# Patient Record
Sex: MALE | Race: WHITE | NOT HISPANIC OR LATINO | Employment: FULL TIME | URBAN - METROPOLITAN AREA
[De-identification: names, ages, dates, MRNs, and addresses within clinical notes are randomized per-mention and may not be internally consistent; named-entity substitution may affect disease eponyms.]

---

## 2020-05-12 ENCOUNTER — OFFICE VISIT (OUTPATIENT)
Dept: URGENT CARE | Facility: MEDICAL CENTER | Age: 40
End: 2020-05-12
Payer: COMMERCIAL

## 2020-05-12 ENCOUNTER — APPOINTMENT (OUTPATIENT)
Dept: RADIOLOGY | Facility: MEDICAL CENTER | Age: 40
End: 2020-05-12
Payer: COMMERCIAL

## 2020-05-12 VITALS
HEIGHT: 77 IN | DIASTOLIC BLOOD PRESSURE: 71 MMHG | BODY MASS INDEX: 27.75 KG/M2 | HEART RATE: 94 BPM | RESPIRATION RATE: 16 BRPM | WEIGHT: 235 LBS | SYSTOLIC BLOOD PRESSURE: 136 MMHG | OXYGEN SATURATION: 97 %

## 2020-05-12 DIAGNOSIS — M25.572 ACUTE LEFT ANKLE PAIN: ICD-10-CM

## 2020-05-12 DIAGNOSIS — M79.662 PAIN OF LEFT CALF: ICD-10-CM

## 2020-05-12 DIAGNOSIS — M25.572 ACUTE LEFT ANKLE PAIN: Primary | ICD-10-CM

## 2020-05-12 PROCEDURE — G0382 LEV 3 HOSP TYPE B ED VISIT: HCPCS | Performed by: FAMILY MEDICINE

## 2020-05-12 PROCEDURE — 73610 X-RAY EXAM OF ANKLE: CPT

## 2020-05-12 RX ORDER — ACETAMINOPHEN 325 MG/1
975 TABLET ORAL ONCE
Status: COMPLETED | OUTPATIENT
Start: 2020-05-12 | End: 2020-05-12

## 2020-05-12 RX ADMIN — ACETAMINOPHEN 975 MG: 325 TABLET ORAL at 17:30

## 2020-05-14 ENCOUNTER — HOSPITAL ENCOUNTER (OUTPATIENT)
Dept: MRI IMAGING | Facility: HOSPITAL | Age: 40
Discharge: HOME | End: 2020-05-14
Attending: ORTHOPAEDIC SURGERY
Payer: COMMERCIAL

## 2020-05-14 ENCOUNTER — TELEPHONE (OUTPATIENT)
Dept: OBGYN CLINIC | Facility: HOSPITAL | Age: 40
End: 2020-05-14

## 2020-05-14 VITALS
BODY MASS INDEX: 27.75 KG/M2 | SYSTOLIC BLOOD PRESSURE: 123 MMHG | HEIGHT: 77 IN | DIASTOLIC BLOOD PRESSURE: 74 MMHG | HEART RATE: 77 BPM | TEMPERATURE: 98.1 F | WEIGHT: 235 LBS

## 2020-05-14 DIAGNOSIS — S86.012A ACHILLES TENDON RUPTURE, LEFT, INITIAL ENCOUNTER: ICD-10-CM

## 2020-05-14 DIAGNOSIS — S86.012A ACHILLES TENDON RUPTURE, LEFT, INITIAL ENCOUNTER: Primary | ICD-10-CM

## 2020-05-14 DIAGNOSIS — M79.662 PAIN OF LEFT CALF: ICD-10-CM

## 2020-05-14 PROCEDURE — 99203 OFFICE O/P NEW LOW 30 MIN: CPT | Performed by: ORTHOPAEDIC SURGERY

## 2020-05-14 PROCEDURE — 73721 MRI JNT OF LWR EXTRE W/O DYE: CPT

## 2020-05-14 RX ORDER — IBUPROFEN 200 MG
TABLET ORAL EVERY 6 HOURS PRN
COMMUNITY

## 2020-05-14 RX ORDER — ACETAMINOPHEN 500 MG
500 TABLET ORAL EVERY 6 HOURS PRN
COMMUNITY
End: 2020-09-04

## 2020-05-15 VITALS
BODY MASS INDEX: 27.87 KG/M2 | HEIGHT: 77 IN | HEART RATE: 79 BPM | SYSTOLIC BLOOD PRESSURE: 123 MMHG | DIASTOLIC BLOOD PRESSURE: 76 MMHG

## 2020-05-15 DIAGNOSIS — S86.012A ACHILLES TENDON RUPTURE, LEFT, INITIAL ENCOUNTER: Primary | ICD-10-CM

## 2020-05-15 PROCEDURE — 99213 OFFICE O/P EST LOW 20 MIN: CPT | Performed by: ORTHOPAEDIC SURGERY

## 2020-05-29 VITALS
SYSTOLIC BLOOD PRESSURE: 129 MMHG | BODY MASS INDEX: 27.87 KG/M2 | HEART RATE: 78 BPM | DIASTOLIC BLOOD PRESSURE: 77 MMHG | HEIGHT: 77 IN

## 2020-05-29 DIAGNOSIS — S86.012D RUPTURE OF LEFT ACHILLES TENDON, SUBSEQUENT ENCOUNTER: Primary | ICD-10-CM

## 2020-05-29 PROCEDURE — 99213 OFFICE O/P EST LOW 20 MIN: CPT | Performed by: ORTHOPAEDIC SURGERY

## 2020-07-10 VITALS — HEIGHT: 77 IN | BODY MASS INDEX: 27.87 KG/M2

## 2020-07-10 DIAGNOSIS — S86.012D RUPTURE OF LEFT ACHILLES TENDON, SUBSEQUENT ENCOUNTER: Primary | ICD-10-CM

## 2020-07-10 PROCEDURE — 99213 OFFICE O/P EST LOW 20 MIN: CPT | Performed by: ORTHOPAEDIC SURGERY

## 2020-07-10 NOTE — PATIENT INSTRUCTIONS
ALEXANDER Malagon    Attending, Orthopaedic Surgery  [facility]    Achilles Rupture - Nonoperative Treatment Protocol    OVERVIEW:   Week 0: cast, touchdown weightbearing with crutches / walker  o Avoid prolonged dependent position (keep foot elevated)   Week 2: CAM walker boot with 3 heel wedges, weightbearing as tolerated  o Wear boot AT ALL TIMES   Week 6: CAM walker boot, remove one wedge each week (so down to 2 wedges at week 6, 1 wedge at week 7, 0 wedges at week 8)  o Start gentle active ankle range of motion (trace alphabet with toes, circles in both directions)   Week 8: start PT, transition to shoe with heel lift, wean off crutches  o PT 2-3 times/week and home exercises daily   Progress with PT over ~4 months  o Week 12: wean off of shoe lift  o Week 20 / 5 months: gentle jog   Week 24 / 6 months:  o Gradual return to sports as tolerated    DETAILED PHYSICAL THERAPY PROTOCOL:  Phase I: weeks 8 - 12   Goals:  o Normalize gait  o Progress range of motion  o Normalize dorsiflexion, inversion, and eversion ankle strength 5/5   Treatment Recommendations:  o Gait training, wean off crutches/cane when gait non-antalgic  o Heel lift in shoe to assist non-apprehensive and normalized gait  o AROM dorsiflexion/plantarflexion/inversion/eversion  o Proprioception training  o Isometrics/isotonics: inversion/eversion  o Sitting heel rise  o PREs plantarflexion/dorsiflexion with knee flexed to 90; after 2 weeks, progress to PREs with knee extended to 0  o Leg press  o Bike  o Alphabet drawing  o Retro treadmill  o Forward step up program  o Underwater treadmill system for gait training   Precautions:  o Avoid passive heel cord stretching   Minimum Criteria for Advancement:  o Normal gait pattern  o Manual muscle grade test of 5/5 for dorsiflexion, inversion, and eversion    Phase II: weeks 12 - 20   Goals:  o Restore full functional range of motion  o Normalize plantarflexion strength 5/5  o Normalize balance  o Return to functional activities without pain  o Ability to descend stairs   Treatment Recommendations:  o Submaximal sport-specific skill development  o Proprioception training: BAPS, prop board, foam rollers, trampoline, [employer]  o Isometrics/isotonics: inversion/eversion  o Isokinetic plantarflexion/dorsiflexion  o Standing heel rise  o Aggressive PREs plantarflexion  o Progress proximal strengthening (PREs)  o Bike, Stairmaster, Versaclimber  o Forward step down program  o Running in underwater treadmill system   Precautions:  o Avoid pain with therapeutic exercise and functional activities  o Avoid high loading the Achilles tendon (i e  aggressive stretching in dorsiflexion with body weight or jumping)   Minimum Criteria for Advancement:  o No apprehension with activities of daily living  o Normal flexibility  o Adequate strength base shown by ability to perform ten unilateral heel raises  o Ability to descend stairs reciprocally  o Symmetrical lower extremity balance    Phase III: weeks 20 - 28   Goals:  o Demonstrate ability to run forward on a treadmill symptom-free  o Average peak torque of 75% with isokinetic testing  o Maximize strength and flexibility as to meet all demands of ADLs  o Return to functional activity without limitation  o Higher level of dynamic activity with lack of apprehension with sport-specific movements   Treatment Recommendations:  o Start forward treadmill running  o Isokinetic testing and training  o Continue lower extremity strengthening and flexibility program  o Advance proprioception training with perturbation  o Light plyometric training (bilateral jumping activities)  o Continue aggressive plantarflexion PREs (emphasize eccentric activity)  o Submaximal sport specific skill development drills  o Progress bike, Stairmaster, Versaclimber  o Continue to progress proximal strengthening of lower extremities (PREs)   Precautions:  o No apprehension or pain with dynamic activity  o Avoid running or sport activity until adequate strength and flexibility is achieved   Minimum Criteria for Advancement:  o Pain free running  o Average peak torque of isokinetic test = 75% of non-involved  o Normal strength (5/5 throughout ankle)  o Sports-specific drills with zero apprehension    Phase IV: Return to Sport (weeks 28 - one year)   Goals:  o Lack of apprehension with sports activity  o Maximize strength and flexibility as to meet demands of individual's sport activity   Treatment Recommendations:  o Advanced functional exercises and agility exercises  o Plyometrics  o Sport-specific exercises  o Isokinetic testing  o Functional test assessment (such as vertical jump test)   Precautions:  o Avoid pain with therapeutic, functional, and sport activity  o Avoid full sport activity until adequate strength and flexibility   Criteria for Discharge:  o Flexibility and strength to accepted levels for sports performance  o Lack of apprehension with sport-specific movements  o 85% limb symmetry with vertical jump test  o 85% limb symmetry for average peak isokinetic torque (PF/DF/inv/ev)  o Independent performance of gym/home exercise program

## 2020-07-10 NOTE — PROGRESS NOTES
ALEXANDER Loco  Attending, Orthopaedic Surgery  [facility] [address] [facility] [address] [facility]      ORTHOPAEDIC FOOT AND ANKLE CLINIC VISIT     Assessment:     Encounter Diagnosis   Name Primary?  Rupture of left Achilles tendon, subsequent encounter Yes            Plan:   · The patient verbalized understanding of exam findings and treatment plan  We engaged in the shared decision-making process and treatment options were discussed at length with the patient  Surgical and conservative management discussed today along with risks and benefits  · He is now 8 weeks from the injury  His resting equinus tone has returned to baseline which is great  · He will start PT  Protocol provided  · Lovenox completed  · Wean boot into sneaker with soft heel lift for next 4 weeks  · See back in 6 weeks      History of Present Illness:   Chief Complaint:   Chief Complaint   Patient presents with    Left Ankle - Follow-up     Bonilla Sanchez is a 37 y o  male who is being seen in follow-up for left achilles rupture  When we last saw he we recommended heel lift CAM boot  Pain has improved  Residual pain is localized at ankle with minimal radiating and described as sharp and severe  Pain/symptom timing:  Worse during the day when active  Pain/symptom context:  Worse with activites and work  Pain/symptom modifying factors:  Rest makes better, activities make worse  Pain/symptom associated signs/symptoms: none    Prior treatment   · NSAIDsYes   · Injections No   · Bracing/Orthotics Yes    · Physical Therapy No     Orthopedic Surgical History:   See below    Past Medical, Surgical and Social History:  Past Medical History:  has no past medical history on file  Problem List: does not have a problem list on file  Past Surgical History:  has no past surgical history on file  Family History: family history is not on file  Social History:  reports that he has never smoked   He has never used smokeless tobacco  His alcohol and drug histories are not on file  Current Medications: has a current medication list which includes the following prescription(s): acetaminophen, enoxaparin, and ibuprofen  Allergies: is allergic to naproxen  Review of Systems:  General- denies fever/chills  HEENT- denies hearing loss or sore throat  Eyes- denies eye pain or visual disturbances, denies red eyes  Respiratory- denies cough or SOB  Cardio- denies chest pain or palpitations  GI- denies abdominal pain  Endocrine- denies urinary frequency  Urinary- denies pain with urination  Musculoskeletal- Negative except noted above  Skin- denies rashes or wounds  Neurological- denies dizziness or headache  Psychiatric- denies anxiety or difficulty concentrating    Physical Exam:   Ht 6' 5" (1 956 m)   BMI 27 87 kg/m²   General/Constitutional: No apparent distress: well-nourished and well developed  Eyes: normal ocular motion  Lymphatic: No appreciable lymphadenopathy  Respiratory: Non-labored breathing  Vascular: No edema, swelling or tenderness, except as noted in detailed exam   Integumentary: No impressive skin lesions present, except as noted in detailed exam   Neuro: No ataxia or tremors noted  Psych: Normal mood and affect, oriented to person, place and time  Appropriate affect  Musculoskeletal: Normal, except as noted in detailed exam and in HPI  Examination    Left    Gait Antalgic   Musculoskeletal No TTP    Skin Normal       Nails Normal    Range of Motion  20 degrees dorsiflexion, 30 degrees plantarflexion  Subtalar motion: normal    Stability Stable    Muscle Strength 5/5 tibialis anterior  5/5 gastrocnemius-soleus  5/5 posterior tibialis  5/5 peroneal/eversion strength  5/5 EHL  5/5 FHL    Neurologic Normal    Sensation  Intact to light touch throughout sural, saphenous, superficial peroneal, deep peroneal and medial/lateral plantar nerve distributions  [place] 5 07 filament (10g) testing deferred      Cardiovascular Brisk capillary refill < 2 seconds,intact DP and PT pulses    Special Tests None      Imaging Studies:   No new imaging      James R Lachman, MD  Foot & Ankle Surgery   Department of [address] [place] [address]      I personally performed the service  Marvina Ling Lachman, MD

## 2020-07-14 ENCOUNTER — EVALUATION (OUTPATIENT)
Dept: PHYSICAL THERAPY | Facility: CLINIC | Age: 40
End: 2020-07-14
Payer: COMMERCIAL

## 2020-07-14 DIAGNOSIS — S86.012D RUPTURE OF LEFT ACHILLES TENDON, SUBSEQUENT ENCOUNTER: ICD-10-CM

## 2020-07-14 PROCEDURE — 97161 PT EVAL LOW COMPLEX 20 MIN: CPT | Performed by: PHYSICAL THERAPIST

## 2020-07-14 PROCEDURE — 97112 NEUROMUSCULAR REEDUCATION: CPT | Performed by: PHYSICAL THERAPIST

## 2020-07-14 NOTE — PROGRESS NOTES
PT Evaluation     Today's date: 2020  Patient name: Kwame Laughlin  : 1980  MRN: [id number]  Referring provider: Alejandra Ro MD  Dx:   Encounter Diagnosis     ICD-10-CM    1  Rupture of left Achilles tendon, subsequent encounter S86 012D Ambulatory referral to Physical Therapy                  Assessment  Assessment details: 37year old male patient reports to PT with non-op L achilles tendon rupture which occurred about 8 5 weeks ago  Patient ambulating with CAM boot and is weaning from heel lifts in it and then transitioning to normal shoe over the next several days  No red flags noted and patient denies complications since initial injury  Patient presents as expected 8 5 weeks since injury  Progressing patient based on achilles tendon non-op protocol provided from patient's MD  Patient will benefit from skilled PT services to address current impairments and functional limitations to help patient return to his PLOF  Impairments: abnormal gait, abnormal or restricted ROM, abnormal movement, activity intolerance, impaired balance, impaired physical strength, lacks appropriate home exercise program and pain with function    Symptom irritability: lowUnderstanding of Dx/Px/POC: good   Prognosis: good    Goals  STG  1  Patient will be independent with completion of HEP throughout therapy  2  Patient will ambulate in a shoe on his L LE with no heel lifts without discomfort in 3 weeks  LTG  1  Patient will increase L ankle DF ROM to at least 8 deg so patient can begin to ambulate with more normal gait pattern in 8 weeks  2  Patient will navigate stairs without increase in symptoms in L ankle in 10 weeks  3  Patient will begin to run without discomfort in 12 weeks       Plan  Patient would benefit from: skilled physical therapy  Planned therapy interventions: joint mobilization, manual therapy, neuromuscular re-education, patient education, strengthening, stretching, therapeutic activities, therapeutic exercise, home exercise program, functional ROM exercises, flexibility, gait training, balance and balance/weight bearing training  Frequency: 2x week  Duration in weeks: 6  Treatment plan discussed with: patient        Subjective Evaluation    History of Present Illness  Mechanism of injury: Patient reports with achilles tendon rupture about 8 5 weeks while playing basketball  Patient is currently in CAM boot with one heel lift, which he has been weaning off of with lifts and starting to wear his shoes again  Patient uses scooter to get around when having to stand and walk long distances  Patient hasn't used crutches in a few weeks  Patient would like to get back to running, and being active  Pain  Current pain ratin  At best pain rating: 3  At worst pain ratin  Quality: dull ache and sharp  Relieving factors: change in position, ice and medications    Treatments  Current treatment: physical therapy  Patient Goals  Patient goals for therapy: decreased edema, decreased pain, increased motion, improved balance, increased strength and return to sport/leisure activities          Objective     Observations   Left Ankle/Foot   Positive for edema  Active Range of Motion     Additional Active Range of Motion Details  DF/PF completed with knee bent: significantly limited each direction AROM on L LE  Inversion:eversion moderately limited AROM on L LE    Strength/Myotome Testing     Additional Strength Details  Strength not tested    General Comments:       Ankle/Foot Comments   Objective testing limited due to being non-op achilles rupture 8 5 weeks out      Flowsheet Rows      Most Recent Value   PT/OT G-Codes   Current Score  55   Projected Score  76             Precautions: none      Manuals             Ankle PROM                                                    Neuro Re-Ed             SLS             Gait training over [facility] Ther Ex             Ankle inv/eversion isometrics to theraband reviewed            Ankle circles/alphabet with knee bent  reviewed            Seated heel raises with knee bent for 2 weeks reviewed            Seated ankle DF w/ knee bent for 2 weeks  reviewed                         Recumbent bike                                        Ther Activity                                       Gait Training                                       Modalities

## 2020-07-17 ENCOUNTER — OFFICE VISIT (OUTPATIENT)
Dept: PHYSICAL THERAPY | Facility: CLINIC | Age: 40
End: 2020-07-17
Payer: COMMERCIAL

## 2020-07-17 DIAGNOSIS — S86.012D RUPTURE OF LEFT ACHILLES TENDON, SUBSEQUENT ENCOUNTER: Primary | ICD-10-CM

## 2020-07-17 PROCEDURE — 97112 NEUROMUSCULAR REEDUCATION: CPT | Performed by: PHYSICAL THERAPIST

## 2020-07-17 PROCEDURE — 97110 THERAPEUTIC EXERCISES: CPT | Performed by: PHYSICAL THERAPIST

## 2020-07-17 NOTE — PROGRESS NOTES
Daily Note     Today's date: 2020  Patient name: Maxwell Denny  : 1980  MRN: [id number]  Referring provider: Florencia Messina MD  Dx:   Encounter Diagnosis     ICD-10-CM    1  Rupture of left Achilles tendon, subsequent encounter S86 012D                   Subjective: Patient notes getting more confident with his exercises  Objective: See treatment diary below      Assessment: Tolerated treatment well  Patient would benefit from continued PT  Patient down to 0 wedges in CAM boot, transitioning to shoe in a few days  Eversion/Inversion MRE completed and DF/PF completed with knee at 90 deg to limit stretch on achilles  No increase in pain post        Plan: Progress treatment as tolerated         Precautions: none      Manuals            Ankle PROM  20x CW/CCW                                                  Neuro Re-Ed             SLS             [facility]              Povole board  L1 10x CW/CCW                                                               Ther Ex             Ankle inv/eversion isometrics to theraband reviewed MRE 2x10 each           Ankle circles/alphabet with knee bent  reviewed            Seated heel raises with knee bent for 2 weeks reviewed 2x15            Seated ankle DF w/ knee bent for 2 weeks  reviewed 2x15                        Recumbent bike                                        Ther Activity                                       Gait Training                                       Modalities

## 2020-07-20 ENCOUNTER — OFFICE VISIT (OUTPATIENT)
Dept: PHYSICAL THERAPY | Facility: CLINIC | Age: 40
End: 2020-07-20
Payer: COMMERCIAL

## 2020-07-20 DIAGNOSIS — S86.012D RUPTURE OF LEFT ACHILLES TENDON, SUBSEQUENT ENCOUNTER: Primary | ICD-10-CM

## 2020-07-20 PROCEDURE — 97110 THERAPEUTIC EXERCISES: CPT | Performed by: PHYSICAL THERAPIST

## 2020-07-20 PROCEDURE — 97112 NEUROMUSCULAR REEDUCATION: CPT | Performed by: PHYSICAL THERAPIST

## 2020-07-20 NOTE — PROGRESS NOTES
Daily Note     Today's date: 2020  Patient name: Faiza June  : 1980  MRN: [id number]  Referring provider: Nay Dailey MD  Dx:   Encounter Diagnosis     ICD-10-CM    1  Rupture of left Achilles tendon, subsequent encounter S86 012D                   Subjective: Patient notes getting more confident with his exercises  Started to wear a shoe with 1 heel lift today, and has been feeling ok, just tired in his foot  Objective: See treatment diary below      Assessment: Tolerated treatment well  Patient would benefit from continued PT  Patient doing well with transition to shoe with heel lift  Resistance band initiated with inv/ev and DF/PF still ompleted with knee at 90 deg to limit stretch on achilles  Lower level balance exercises initiated this treatment  No increase in pain post        Plan: Progress treatment as tolerated         Precautions: none      Manuals           Ankle PROM  20x CW/CCW [facility]                                                 Neuro Re-Ed             SLS             Gait training over [facility]              MentiNovabble board  L1 10x CW/CCW L1 12x each way          Tandem stance   5x 10" holds                                                  Ther Ex             Ankle inv/eversion isometrics to theraband reviewed MRE 2x10 each 2x10 yellow band each          Ankle circles/alphabet with knee bent  reviewed            Seated heel raises with knee bent for 2 weeks reviewed 2x15  2x20          Seated ankle DF w/ knee bent for 2 weeks  reviewed 2x15 2x20                       Recumbent bike    stationary 6 min lvl 2                                     Ther Activity                                       Gait Training                                       Modalities

## 2020-07-24 ENCOUNTER — OFFICE VISIT (OUTPATIENT)
Dept: PHYSICAL THERAPY | Facility: CLINIC | Age: 40
End: 2020-07-24
Payer: COMMERCIAL

## 2020-07-24 DIAGNOSIS — S86.012D RUPTURE OF LEFT ACHILLES TENDON, SUBSEQUENT ENCOUNTER: Primary | ICD-10-CM

## 2020-07-24 PROCEDURE — 97110 THERAPEUTIC EXERCISES: CPT | Performed by: PHYSICAL THERAPIST

## 2020-07-24 PROCEDURE — 97112 NEUROMUSCULAR REEDUCATION: CPT | Performed by: PHYSICAL THERAPIST

## 2020-07-24 NOTE — PROGRESS NOTES
Daily Note     Today's date: 2020  Patient name: Gordo Barnhart  : 1980  MRN: [id number]  Referring provider: Emily Suresh MD  Dx:   Encounter Diagnosis     ICD-10-CM    1  Rupture of left Achilles tendon, subsequent encounter S86 012D                   Subjective: Patient notes transitioned to shoe without heel lift and even able to walk barefoot at home occasionally  Objective: See treatment diary below      Assessment: Tolerated treatment well  Patient would benefit from continued PT  Patient is 9 5 weeks out since initial injury and when patient was initially placed in boot  Patient doing well with transition to shoe without heel lift  Leg press and retro walking on treadmill completed to initiate more CKC exercises, and DF/PF still completed with knee at 90 deg to limit stretch on achilles  No increase in pain post        Plan: Progress treatment as tolerated         Precautions: none      Manuals          Ankle PROM  20x CW/CCW [address] [place]                      Posterior talocrural mobs L      nv                      Neuro Re-Ed             SLS             [facility]              Wobble board  L1 10x CW/CCW L1 12x each way L1 20x each way          Tandem stance   5x 10" holds  3x30"                                                Ther Ex             Ankle inv/eversion isometrics to theraband reviewed MRE 2x10 each 2x10 yellow band each 2x12 yellow each         Ankle circles/alphabet with knee bent  reviewed            Seated heel raises with knee bent for 2 weeks reviewed 2x15  2x20 2x20  8 lbs         Seated ankle DF w/ knee bent for 2 weeks  reviewed 2x15 2x20 2x20                       Recumbent bike    stationary 6 min lvl 2  6 min lvl3          Leg press    3x10 185 lbs          Retro walking on treadmill    2 min 0 3 mph         Ther Activity                                       Gait Training                                       Modalities

## 2020-07-27 ENCOUNTER — OFFICE VISIT (OUTPATIENT)
Dept: PHYSICAL THERAPY | Facility: CLINIC | Age: 40
End: 2020-07-27
Payer: COMMERCIAL

## 2020-07-27 DIAGNOSIS — S86.012D RUPTURE OF LEFT ACHILLES TENDON, SUBSEQUENT ENCOUNTER: Primary | ICD-10-CM

## 2020-07-27 PROCEDURE — 97110 THERAPEUTIC EXERCISES: CPT | Performed by: PHYSICAL THERAPIST

## 2020-07-27 PROCEDURE — 97112 NEUROMUSCULAR REEDUCATION: CPT | Performed by: PHYSICAL THERAPIST

## 2020-07-27 NOTE — PROGRESS NOTES
Daily Note     Today's date: 2020  Patient name: Radames Miranda  : 1980  MRN: [id number]  Referring provider: Whit Dash MD  Dx:   Encounter Diagnosis     ICD-10-CM    1  Rupture of left Achilles tendon, subsequent encounter S86 012D                   Subjective: Patient notes felt good after last treatment, worked 5 days in a row and didn't have much increase in swelling except at night, but when woke up the next day, had normal swelling  Patient notes a little sore in calf not achilles after last visit  Objective: See treatment diary below      Assessment: Tolerated treatment well  Patient would benefit from continued PT  Patient is 10 weeks out since initial injury and when patient was initially placed in boot  Patient progressing as expected  DF/PF still completed with knee at 90 deg to limit stretch on achilles  No increase in pain post        Plan: Progress treatment as tolerated         Precautions: none      Manuals         Ankle PROM  20x CW/CCW [address]                     Posterior talocrural mobs L      3x30"                      Neuro Re-Ed             SLS     Not ready yet        Gait training over [employer]              Wobble board  L1 10x CW/CCW L1 12x each way L1 20x each way  L2 20x each way        Tandem stance   5x 10" holds  3x30" 3x30"                                                Ther Ex             Ankle inv/eversion isometrics to theraband reviewed MRE 2x10 each 2x10 yellow band each 2x12 yellow each 2x12 red each         Ankle circles/alphabet with knee bent  reviewed            Seated heel raises with knee bent for 2 weeks reviewed 2x15  2x20 2x20  8 lbs 2x20 8 lbs         Seated ankle DF w/ knee bent for 2 weeks  reviewed 2x15 2x20 2x20  2x20         Step ups     4" step up L down L 10x        Recumbent bike    stationary 6 min lvl 2  6 min lvl3  6 min lvl 4         Leg press    3x10 185 lbs  3x15 205 lbs         Retro walking on treadmill 2 min 0 3 mph         Ther Activity                                       Gait Training                                       Modalities

## 2020-07-31 ENCOUNTER — OFFICE VISIT (OUTPATIENT)
Dept: PHYSICAL THERAPY | Facility: CLINIC | Age: 40
End: 2020-07-31
Payer: COMMERCIAL

## 2020-07-31 DIAGNOSIS — S86.012D RUPTURE OF LEFT ACHILLES TENDON, SUBSEQUENT ENCOUNTER: Primary | ICD-10-CM

## 2020-07-31 PROCEDURE — 97112 NEUROMUSCULAR REEDUCATION: CPT | Performed by: PHYSICAL THERAPIST

## 2020-07-31 PROCEDURE — 97110 THERAPEUTIC EXERCISES: CPT | Performed by: PHYSICAL THERAPIST

## 2020-07-31 NOTE — PROGRESS NOTES
Daily Note     Today's date: 2020  Patient name: Kwame Laughlin  : 1980  MRN: [id number]  Referring provider: Alejandra Ro MD  Dx:   Encounter Diagnosis     ICD-10-CM    1  Rupture of left Achilles tendon, subsequent encounter S86 012D        Start Time: 830          Subjective: Patient notes felt good after last treatment, worked 4 days in a row and had a long day yesterday at work so is a little sore today  Objective: See treatment diary below      Assessment: Tolerated treatment well  Patient would benefit from continued PT  Patient is 11 weeks out since initial injury and when patient was initially placed in boot  Patient progressing as expected  DF/PF still completed with knee extended this visit as per protocol allowed  No increase in pain post        Plan: Progress treatment as tolerated         Precautions: none      Manuals        Ankle PROM  20x CW/CCW [address]                    Posterior talocrural mobs L      3x30"  3x30"                     Neuro Re-Ed             SLS     Not ready yet        Gait training over bolster              Wobble board  L1 10x CW/CCW L1 12x each way L1 20x each way  L2 20x each way L2 20x each way        Tandem stance   5x 10" holds  3x30" 3x30"  3x30"                                               Ther Ex             Ankle inv/eversion isometrics to theraband reviewed MRE 2x10 each 2x10 yellow band each 2x12 yellow each 2x12 red each  2x12 red w/ DF/PF        Ankle circles/alphabet with knee bent  reviewed            Seated heel raises with knee bent for 2 weeks reviewed 2x15  2x20 2x20  8 lbs 2x20 8 lbs  2x10 8 lbs - 10 lbs        Seated ankle DF w/ knee bent for 2 weeks  reviewed 2x15 2x20 2x20  2x20  D/C       Step ups     4" step up L down L 10x 2x6 4"        Recumbent bike    stationary 6 min lvl 2  6 min lvl3  6 min lvl 4  6 min lvl 4        Leg press    3x10 185 lbs  3x15 205 lbs  3x15 205 lbs       Retro walking on treadmill    2 min 0 3 mph 2 min 0 3 mph 2 min 0 5 mph        Ther Activity                                       Gait Training                                       Modalities

## 2020-08-10 ENCOUNTER — APPOINTMENT (OUTPATIENT)
Dept: PHYSICAL THERAPY | Facility: CLINIC | Age: 40
End: 2020-08-10
Payer: COMMERCIAL

## 2020-08-12 ENCOUNTER — OFFICE VISIT (OUTPATIENT)
Dept: PHYSICAL THERAPY | Facility: CLINIC | Age: 40
End: 2020-08-12
Payer: COMMERCIAL

## 2020-08-12 DIAGNOSIS — S86.012D RUPTURE OF LEFT ACHILLES TENDON, SUBSEQUENT ENCOUNTER: Primary | ICD-10-CM

## 2020-08-12 PROCEDURE — 97112 NEUROMUSCULAR REEDUCATION: CPT | Performed by: PHYSICAL THERAPIST

## 2020-08-12 PROCEDURE — 97110 THERAPEUTIC EXERCISES: CPT | Performed by: PHYSICAL THERAPIST

## 2020-08-12 NOTE — PROGRESS NOTES
Daily Note     Today's date: 2020  Patient name: Gordo Barnhart  : 1980  MRN: [id number]  Referring provider: Emily Suresh MD  Dx:   Encounter Diagnosis     ICD-10-CM    1  Rupture of left Achilles tendon, subsequent encounter  S86 012D                   Subjective: Patient notes was on vacation last week, and has been progressing exercises and is walking better  Worked 13 hours yesterday, so a little tight today  Objective: See treatment diary below      Assessment: Tolerated treatment well  Patient would benefit from continued PT  Patient is 12 5 weeks out since initial injury and when patient was initially placed in boot  Patient progressing as expected  Protocol progressed  No increase in pain post        Plan: Progress treatment as tolerated         Precautions: none      Manuals       Ankle PROM  20x CW/CCW [address] [facility] [address]                   Posterior talocrural mobs L      3x30"  3x30"  3x30"                    Neuro Re-Ed             SLS     Not ready yet  3x20"       Gait training over bolster              Wobble board  L1 10x CW/CCW L1 12x each way L1 20x each way  L2 20x each way L2 20x each way  L3 20x each way       Tandem stance   5x 10" holds  3x30" 3x30"  3x30"  D/C                                             Ther Ex             Ankle inv/eversion isometrics to theraband reviewed MRE 2x10 each 2x10 yellow band each 2x12 yellow each 2x12 red each  2x12 red w/ DF/PF  2x12 grn w/ inv/EV/DF       Ankle circles/alphabet with knee bent  reviewed            Seated heel raises with knee bent for 2 weeks reviewed 2x15  2x20 2x20  8 lbs 2x20 8 lbs  2x10 8 lbs - 10 lbs  standing B heel raises 2x8       Seated ankle DF w/ knee bent for 2 weeks  reviewed 2x15 2x20 2x20  2x20  D/C       Step ups     4" step up L down L 10x 2x6 4"  2x8 8"       Recumbent bike    stationary 6 min lvl 2  6 min lvl3  6 min lvl 4  6 min lvl 4  6 min lvl 5       Leg press    3x10 185 lbs  3x15 205 lbs  3x15 205 lbs 3x10 225 lbs       Retro walking on treadmill    2 min 0 3 mph 2 min 0 3 mph 2 min 0 5 mph  2 min 0 7 mph      Step downs       8x 4" step                                 Ther Activity                                       Gait Training                                       Modalities

## 2020-08-13 ENCOUNTER — APPOINTMENT (OUTPATIENT)
Dept: PHYSICAL THERAPY | Facility: CLINIC | Age: 40
End: 2020-08-13
Payer: COMMERCIAL

## 2020-08-17 ENCOUNTER — OFFICE VISIT (OUTPATIENT)
Dept: PHYSICAL THERAPY | Facility: CLINIC | Age: 40
End: 2020-08-17
Payer: COMMERCIAL

## 2020-08-17 DIAGNOSIS — S86.012D RUPTURE OF LEFT ACHILLES TENDON, SUBSEQUENT ENCOUNTER: Primary | ICD-10-CM

## 2020-08-17 PROCEDURE — 97112 NEUROMUSCULAR REEDUCATION: CPT | Performed by: PHYSICAL THERAPIST

## 2020-08-17 PROCEDURE — 97110 THERAPEUTIC EXERCISES: CPT | Performed by: PHYSICAL THERAPIST

## 2020-08-17 NOTE — PROGRESS NOTES
Daily Note     Today's date: 2020  Patient name: Jennie Holden  : 1980  MRN: [id number]  Referring provider: Annelise Ignacio MD  Dx:   Encounter Diagnosis     ICD-10-CM    1  Rupture of left Achilles tendon, subsequent encounter  S86 012D                   Subjective: Patient notes felt little pull in his calf on Saturday walking, but not much pain after, and sore spot that was there last week is now gone  Objective: See treatment diary below      Assessment: Tolerated treatment well  Patient would benefit from continued PT  Patient is 13 weeks out since initial injury and when patient was initially placed in boot  Patient progressing as expected  Exercises progressed  No increase in pain post        Plan: Progress treatment as tolerated         Precautions: none      Manuals      Ankle PROM  20x CW/CCW [address] [facility] [address]     Calf stretch       3x30" 3x30"      Posterior talocrural mobs L      3x30"  3x30"  3x30"   [facility] [address]                  Neuro Re-Ed             SLS     Not ready yet  3x20"  5x 10" holds grn foam     Gait training over bolster              Wobble board  L1 10x CW/CCW L1 12x each way L1 20x each way  L2 20x each way L2 20x each way  L3 20x each way  L3 20x Cw/CCW/ PF,DF     Tandem stance   5x 10" holds  3x30" 3x30"  3x30"  D/C                                             Ther Ex             Ankle inv/eversion isometrics to theraband reviewed MRE 2x10 each 2x10 yellow band each 2x12 yellow each 2x12 red each  2x12 red w/ DF/PF  2x12 grn w/ inv/EV/DF  2x15 grn w/ inv/EV/DF     Ankle circles/alphabet with knee bent  reviewed            Seated heel raises with knee bent for 2 weeks reviewed 2x15  2x20 2x20  8 lbs 2x20 8 lbs  2x10 8 lbs - 10 lbs  standing B heel raises 2x8  2x10 10 lbs seated, standing B 2x10      Seated ankle DF w/ knee bent for 2 weeks  reviewed 2x15 2x20 2x20  2x20  D/C       Step ups     4" step up L down L 10x 2x6 4"  2x8 8"  2x10 8"      Recumbent bike    stationary 6 min lvl 2  6 min lvl3  6 min lvl 4  6 min lvl 4  6 min lvl 5  6 min lvl 6      Leg press    3x10 185 lbs  3x15 205 lbs  3x15 205 lbs 3x10 225 lbs  3x10 225 lbs     Retro walking on treadmill    2 min 0 3 mph 2 min 0 3 mph 2 min 0 5 mph  2 min 0 7 mph 3 min 0 7 mph     Step downs       8x 4" step  8x 6" step                               Ther Activity                                       Gait Training                                       Modalities

## 2020-08-20 ENCOUNTER — OFFICE VISIT (OUTPATIENT)
Dept: PHYSICAL THERAPY | Facility: CLINIC | Age: 40
End: 2020-08-20
Payer: COMMERCIAL

## 2020-08-20 DIAGNOSIS — S86.012D RUPTURE OF LEFT ACHILLES TENDON, SUBSEQUENT ENCOUNTER: Primary | ICD-10-CM

## 2020-08-20 PROCEDURE — 97140 MANUAL THERAPY 1/> REGIONS: CPT

## 2020-08-20 PROCEDURE — 97110 THERAPEUTIC EXERCISES: CPT

## 2020-08-20 PROCEDURE — 97112 NEUROMUSCULAR REEDUCATION: CPT

## 2020-08-20 NOTE — PROGRESS NOTES
Daily Note     Today's date: 2020  Patient name: Baltazar Greene  : 1980  MRN: [id number]  Referring provider: Mason Montano MD  Dx:   Encounter Diagnosis     ICD-10-CM    1  Rupture of left Achilles tendon, subsequent encounter  S86 012D                   Subjective: Pt reports continued decrease in pain and improved mobility the past few weeks, with most notable improvements on stairs, though he continues to note more limitations with descending versus ascending      Objective: See treatment diary below     Precautions: rupture 3/12/2020    Manuals     Ankle PROM  20x CW/CCW [address] [facility]  [address]    Calf stretch       3x30" 3x30"  SH    Posterior talocrural mobs L      3x30"  3x30"  3x30"  [address]                  Neuro Re-Ed             SLS     Not ready yet  3x20"  5x 10" holds grn foam Green foam 10"x5    Gait training over bolster              Wobble board  L1 10x CW/CCW L1 12x each way L1 20x each way  L2 20x each way L2 20x each way  L3 20x each way  L3 20x Cw/CCW/ PF,DF L3  20x ea  4-ways    Tandem stance   5x 10" holds  3x30" 3x30"  3x30"  D/C      Star drill                                       Ther Ex             Ankle inv/eversion isometrics to theraband reviewed MRE 2x10 each 2x10 yellow band each 2x12 yellow each 2x12 red each  2x12 red w/ DF/PF  2x12 grn w/ inv/EV/DF  2x15 grn w/ inv/EV/DF 2x15 4-ways    Ankle circles/alphabet with knee bent  reviewed        -    Seated heel raises with knee bent for 2 weeks reviewed 2x15  2x20 2x20  8 lbs 2x20 8 lbs  2x10 8 lbs - 10 lbs  standing B heel raises 2x8  2x10 10 lbs seated, standing B 2x10  2x10 ea 15# seated, standing 0#    Seated ankle DF w/ knee bent for 2 weeks  reviewed 2x15 2x20 2x20  2x20  D/C   -    Step ups     4" step up L down L 10x 2x6 4"  2x8 8"  2x10 8"  -    Recumbent bike    stationary 6 min lvl 2  6 min lvl3  6 min lvl 4  6 min lvl 4  6 min lvl 5  6 min lvl 6  L6 6'    Leg press 3x10 185 lbs  3x15 205 lbs  3x15 205 lbs 3x10 225 lbs  3x10 225 lbs 4x10 225#    Retro walking on treadmill    2 min 0 3 mph 2 min 0 3 mph 2 min 0 5 mph  2 min 0 7 mph 3 min 0 7 mph 0 7 mph 3'    Step downs       8x 4" step  8x 6" step -                              steps         12 steps 3x                 Modalities                                           Assessment: Tolerated treatment well, including addition of star drill  Patient demonstrated fatigue post treatment, exhibited good technique with therapeutic exercises and would benefit from continued PT to address remaining deficits  Plan: Continue per plan of care  Progress treatment as tolerated      Gracie Dong, PTA

## 2020-08-24 ENCOUNTER — APPOINTMENT (OUTPATIENT)
Dept: PHYSICAL THERAPY | Facility: CLINIC | Age: 40
End: 2020-08-24
Payer: COMMERCIAL

## 2020-08-26 ENCOUNTER — OFFICE VISIT (OUTPATIENT)
Dept: PHYSICAL THERAPY | Facility: CLINIC | Age: 40
End: 2020-08-26
Payer: COMMERCIAL

## 2020-08-26 DIAGNOSIS — S86.012D RUPTURE OF LEFT ACHILLES TENDON, SUBSEQUENT ENCOUNTER: Primary | ICD-10-CM

## 2020-08-26 PROCEDURE — 97140 MANUAL THERAPY 1/> REGIONS: CPT | Performed by: PHYSICAL THERAPIST

## 2020-08-26 PROCEDURE — 97110 THERAPEUTIC EXERCISES: CPT | Performed by: PHYSICAL THERAPIST

## 2020-08-26 PROCEDURE — 97112 NEUROMUSCULAR REEDUCATION: CPT | Performed by: PHYSICAL THERAPIST

## 2020-08-26 NOTE — PROGRESS NOTES
Daily Note     Today's date: 2020  Patient name: David Plasencia  : 1980  MRN: [id number]  Referring provider: Jj Queen MD  Dx:   Encounter Diagnosis     ICD-10-CM    1  Rupture of left Achilles tendon, subsequent encounter  S86 012D                   Subjective: Patient notes starting to ambulate with more normal gait pattern and feeling stronger  Objective: See treatment diary below      Assessment: Tolerated treatment well  Patient would benefit from continued PT  Patient is 14 weeks out  Fatigued post treatment  Progressing well  Plan: Progress treatment as tolerated         Precautions: rupture 3/12/2020    Manuals    Ankle PROM  20x CW/CCW  [address]  [address]   Calf stretch       3x30" 3x30"  [address]   Posterior talocrural mobs L      3x30"  3x30"  3x30"   [address]                  Neuro Re-Ed             SLS     Not ready yet  3x20"  5x 10" holds grn foam Green foam 10"x5 5x 15" holds grn foam    Gait training over [employer]              Wobble board  L1 10x CW/CCW L1 12x each way L1 20x each way  L2 20x each way L2 20x each way  L3 20x each way  L3 20x Cw/CCW/ PF,DF L3  20x ea  4-ways L4 4 ways 20x each   Tandem walking          2 laps ->   Star drill          15x 3 ways ->                             Ther Ex             Ankle inv/eversion isometrics to theraband reviewed MRE 2x10 each 2x10 yellow band each 2x12 yellow each 2x12 red each  2x12 red w/ DF/PF  2x12 grn w/ inv/EV/DF  2x15 grn w/ inv/EV/DF 2x15 4-ways 2x15 grn    Ankle circles/alphabet with knee bent  reviewed        -    Seated heel raises with knee bent for 2 weeks reviewed 2x15  2x20 2x20  8 lbs 2x20 8 lbs  2x10 8 lbs - 10 lbs  standing B heel raises 2x8  2x10 10 lbs seated, standing B 2x10  2x10 ea 15# seated, standing 0# 2x12 each 15 lbs seated, standing 0 lbs, 10x standing off heel slide board    Seated ankle DF w/ knee bent for 2 weeks  reviewed 2x15 2x20 2x20 2x20  D/C   -    Step ups     4" step up L down L 10x 2x6 4"  2x8 8"  2x10 8"  - 3x10 8"    Recumbent bike    stationary 6 min lvl 2  6 min lvl3  6 min lvl 4  6 min lvl 4  6 min lvl 5  6 min lvl 6  L6 6' 6 min lvl 6    Leg press    3x10 185 lbs  3x15 205 lbs  3x15 205 lbs 3x10 225 lbs  3x10 225 lbs 4x10 225# 4x12 225 lbs    Retro walking on treadmill    2 min 0 3 mph 2 min 0 3 mph 2 min 0 5 mph  2 min 0 7 mph 3 min 0 7 mph 0 7 mph 3' 1 1 mph 3 min   Step downs       8x 4" step  8x 6" step -                              steps         12 steps 3x                 Modalities

## 2020-08-27 ENCOUNTER — OFFICE VISIT (OUTPATIENT)
Dept: PHYSICAL THERAPY | Facility: CLINIC | Age: 40
End: 2020-08-27
Payer: COMMERCIAL

## 2020-08-27 DIAGNOSIS — S86.012D RUPTURE OF LEFT ACHILLES TENDON, SUBSEQUENT ENCOUNTER: Primary | ICD-10-CM

## 2020-08-27 PROCEDURE — 97110 THERAPEUTIC EXERCISES: CPT | Performed by: PHYSICAL THERAPIST

## 2020-08-27 PROCEDURE — 97112 NEUROMUSCULAR REEDUCATION: CPT | Performed by: PHYSICAL THERAPIST

## 2020-08-27 NOTE — PROGRESS NOTES
Daily Note     Today's date: 2020  Patient name: Kwame Lauglhin  : 1980  MRN: [id number]  Referring provider: Alejandra Ro MD  Dx:   Encounter Diagnosis     ICD-10-CM    1  Rupture of left Achilles tendon, subsequent encounter  S86 012D                   Subjective: Patient notes starting to ambulate with more normal gait pattern and feeling stronger  Objective: See treatment diary below      Assessment: Tolerated treatment well  Patient would benefit from continued PT  Patient is 14 weeks out  Fatigued post treatment  Progressing well  Plan: Progress treatment as tolerated         Precautions: rupture 3/12/2020    Manuals    Ankle PROM  [address]         [address]   Calf stretch  [address]         [address]   Posterior talocrural mobs L                           Neuro Re-Ed             SLS 3x20 ball toss red ball          5x 15" holds grn foam    Gait training over [employer] board L4 w ways 20x each         L4 4 ways 20x each   Tandem walking          2 laps ->   Star drill 15x 3 ways grn foam          15x 3 ways ->                             Ther Ex             Ankle inv/eversion isometrics to theraband 2x15 grn          2x15 grn    Ankle circles/alphabet with knee bent              Seated heel raises with knee bent for 2 weeks 2x12 each 15 lbs, standing 0 lbs off heel slide board         2x12 each 15 lbs seated, standing 0 lbs, 10x standing off heel slide board    Seated ankle DF w/ knee bent for 2 weeks              Step ups          3x10 8"    Recumbent bike  6 min lvl 6          6 min lvl 6    Leg press 2x12 155 lbs 2 up 1 down         4x12 225 lbs    Retro walking on treadmill 1 2 mph 3 min          1 1 mph 3 min   Step downs                                       steps 4 laps flight of steps                         Modalities

## 2020-08-31 ENCOUNTER — OFFICE VISIT (OUTPATIENT)
Dept: PHYSICAL THERAPY | Facility: CLINIC | Age: 40
End: 2020-08-31
Payer: COMMERCIAL

## 2020-08-31 DIAGNOSIS — S86.012D RUPTURE OF LEFT ACHILLES TENDON, SUBSEQUENT ENCOUNTER: Primary | ICD-10-CM

## 2020-08-31 PROCEDURE — 97110 THERAPEUTIC EXERCISES: CPT | Performed by: PHYSICAL THERAPIST

## 2020-08-31 PROCEDURE — 97112 NEUROMUSCULAR REEDUCATION: CPT | Performed by: PHYSICAL THERAPIST

## 2020-08-31 NOTE — PROGRESS NOTES
Daily Note     Today's date: 2020  Patient name: Nay Pelletier  : 1980  MRN: [id number]  Referring provider: Shaila Gallegos MD  Dx:   Encounter Diagnosis     ICD-10-CM    1  Rupture of left Achilles tendon, subsequent encounter  S86 012D                   Subjective: Patient notes starting to ambulate with more normal gait pattern and feeling stronger  Objective: See treatment diary below      Assessment: Tolerated treatment well  Patient would benefit from continued PT  Patient is 14 5 weeks out  Fatigued post treatment  Initiated low weight on leg press unilateral heel raises  Progressing well  Plan: Progress treatment as tolerated         Precautions: rupture 3/12/2020    Manuals    Ankle PROM [address] [facility]        [address]   Calf stretch [address] [facility]        [address]   Posterior talocrural mobs L   MK                        Neuro Re-Ed             SLS 3x20 ball toss red ball  3x20 ball toss red ball grn foam         5x 15" holds grn foam                  Wobble board L4 w ways 20x each L4 4 ways 20x each        L4 4 ways 20x each   Tandem walking          2 laps ->   Star drill 15x 3 ways grn foam  15x 3 ways grn foam ->        15x 3 ways ->                             Ther Ex             Ankle inv/eversion isometrics to theraband 2x15 grn  2x10 blue        2x15 grn                 Heel raise variations 2x12 each 15 lbs, standing 0 lbs off heel slide board 3x10 17 5 lbs seated,3x10 B heel raises off 2" step, 2x15 35 lbs leg press U calf raise        2x12 each 15 lbs seated, standing 0 lbs, 10x standing off heel slide board                 Step ups          3x10 8"    Recumbent bike  6 min lvl 6  6 min        6 min lvl 6    Leg press 2x12 155 lbs 2 up 1 down 3x10 155 lbs 2 up 1 down         4x12 225 lbs    Retro walking on treadmill 1 2 mph 3 min          1 1 mph 3 min   Step downs  3x10 8"                                      steps 4 laps flight of steps                         Modalities

## 2020-09-04 ENCOUNTER — OFFICE VISIT (OUTPATIENT)
Dept: OBGYN CLINIC | Facility: CLINIC | Age: 40
End: 2020-09-04
Payer: COMMERCIAL

## 2020-09-04 ENCOUNTER — OFFICE VISIT (OUTPATIENT)
Dept: PHYSICAL THERAPY | Facility: CLINIC | Age: 40
End: 2020-09-04
Payer: COMMERCIAL

## 2020-09-04 VITALS
HEIGHT: 77 IN | HEART RATE: 64 BPM | DIASTOLIC BLOOD PRESSURE: 77 MMHG | BODY MASS INDEX: 27.51 KG/M2 | WEIGHT: 233 LBS | SYSTOLIC BLOOD PRESSURE: 117 MMHG

## 2020-09-04 DIAGNOSIS — S86.012D RUPTURE OF LEFT ACHILLES TENDON, SUBSEQUENT ENCOUNTER: Primary | ICD-10-CM

## 2020-09-04 PROCEDURE — 99213 OFFICE O/P EST LOW 20 MIN: CPT | Performed by: ORTHOPAEDIC SURGERY

## 2020-09-04 PROCEDURE — 97110 THERAPEUTIC EXERCISES: CPT | Performed by: PHYSICAL THERAPIST

## 2020-09-04 PROCEDURE — 97112 NEUROMUSCULAR REEDUCATION: CPT | Performed by: PHYSICAL THERAPIST

## 2020-09-04 NOTE — PROGRESS NOTES
ALEXANDER Devi  Attending, Orthopaedic Surgery  Foot and [address] [facility] [address] [facility]      ORTHOPAEDIC FOOT AND ANKLE CLINIC VISIT     Assessment:     Encounter Diagnosis   Name Primary?  Rupture of left Achilles tendon, subsequent encounter Yes            Plan:   · The patient verbalized understanding of exam findings and treatment plan  We engaged in the shared decision-making process and treatment options were discussed at length with the patient  Surgical and conservative management discussed today along with risks and benefits  · He is now 14 weeks into his recovery  · Continue Phase 2 of PT program until ready for Phase 3  · No running or jogging at this point  Return in about 6 weeks (around 10/16/2020)  History of Present Illness:   Chief Complaint:   Chief Complaint   Patient presents with    Left Ankle - Follow-up     Faiza June is a 37 y o  male who is being seen in follow-up for left achilles rupture  When we last saw he we recommended PT  Pain has improved  Residual pain is localized at left heel with minimal radiating and described as mild  Pain/symptom timing:  Worse during the day when active  Pain/symptom context:  Worse with activites and work  Pain/symptom modifying factors:  Rest makes better, activities make worse  Pain/symptom associated signs/symptoms: none    Prior treatment   · NSAIDsYes   · Injections No   · Bracing/Orthotics Yes    · Physical Therapy Yes     Orthopedic Surgical History:   See below     Past Medical, Surgical and Social History:  Past Medical History:  has no past medical history on file  Problem List: does not have a problem list on file  Past Surgical History:  has no past surgical history on file  Family History: family history is not on file  Social History:  reports that he has never smoked  He has never used smokeless tobacco  No history on file for alcohol and drug    Current Medications: has a current medication list which includes the following prescription(s): enoxaparin and ibuprofen  Allergies: is allergic to naproxen  Review of Systems:  General- denies fever/chills  HEENT- denies hearing loss or sore throat  Eyes- denies eye pain or visual disturbances, denies red eyes  Respiratory- denies cough or SOB  Cardio- denies chest pain or palpitations  GI- denies abdominal pain  Endocrine- denies urinary frequency  Urinary- denies pain with urination  Musculoskeletal- Negative except noted above  Skin- denies rashes or wounds  Neurological- denies dizziness or headache  Psychiatric- denies anxiety or difficulty concentrating    Physical Exam:   /77   Pulse 64   Ht 6' 5" (1 956 m)   Wt 106 kg (233 lb)   BMI 27 63 kg/m²   General/Constitutional: No apparent distress: well-nourished and well developed  Eyes: normal ocular motion  Lymphatic: No appreciable lymphadenopathy  Respiratory: Non-labored breathing  Vascular: No edema, swelling or tenderness, except as noted in detailed exam   Integumentary: No impressive skin lesions present, except as noted in detailed exam   Neuro: No ataxia or tremors noted  Psych: Normal mood and affect, oriented to person, place and time  Appropriate affect  Musculoskeletal: Normal, except as noted in detailed exam and in HPI  Examination    Left    Gait Normal   Musculoskeletal Nontender to palpation at achilles tendon    Skin Mild edema present in the ankle    Nails Normal    Range of Motion  20 degrees dorsiflexion, 40 degrees plantarflexion  Subtalar motion: normal    Stability Stable    Muscle Strength 5/5 tibialis anterior  5/5 gastrocnemius-soleus  5/5 posterior tibialis  5/5 peroneal/eversion strength  5/5 EHL  5/5 FHL    Neurologic Normal    Sensation  Intact to light touch throughout sural, saphenous, superficial peroneal, deep peroneal and medial/lateral plantar nerve distributions  [place] 5 07 filament (10g) testing  deferred      Cardiovascular Brisk capillary refill < 2 seconds,intact DP and PT pulses    Special Tests None      Imaging Studies:   No new imaging      Scribe Attestation    I,:   Bob Stone PA-C am acting as a scribe while in the presence of the attending physician :        I,:   Sly Slaughter MD personally performed the services described in this documentation    as scribed in my presence :                Cleon Ginsberg Lachman, MD  Foot & Ankle Surgery   Department of [address] [place] [address]      I personally performed the service  Cleon Ginsberg Lachman, MD

## 2020-09-04 NOTE — PROGRESS NOTES
Daily Note     Today's date: 2020  Patient name: Renee Orellana  : 1980  MRN: [id number]   Referring provider: Ann Rodríguez MD  Dx:   Encounter Diagnosis     ICD-10-CM    1  Rupture of left Achilles tendon, subsequent encounter  S86 012D        Start Time: 745          Subjective: Patient notes starting to ambulate with more normal gait pattern and feeling stronger  Objective: See treatment diary below      Assessment: Tolerated treatment well  Patient would benefit from continued PT  Patient is 15 weeks out  Fatigued post treatment  Initiated low weight on leg press unilateral heel raises  Progressing well  Plan: Progress treatment as tolerated         Precautions: rupture 3/12/2020    Manuals    Ankle PROM [address] [facility] [address]       [address]   [address] stretch  [address]       [address]   Posterior talocrural mobs L   [address]                       Neuro Re-Ed             SLS 3x20 ball toss red ball  3x20 ball toss red ball grn foam  3x20 ball toss red ball blue foam        5x 15" holds grn foam                  Wobble board L4 w ways 20x each L4 4 ways 20x each L4 4 ways 20x each       L4 4 ways 20x each   Tandem walking          2 laps ->   Star drill 15x 3 ways grn foam  15x 3 ways grn foam -> 15x 3 ways blue foam        15x 3 ways ->   Single leg RDLs   2x8                        Ther Ex             Ankle inv/eversion isometrics to theraband 2x15 grn  2x10 blue 2x12 blue        2x15 grn                 Heel raise variations 2x12 each 15 lbs, standing 0 lbs off heel slide board 3x10 17 5 lbs seated,3x10 B heel raises off 2" step, 2x15 35 lbs leg press U calf raise 22 5 lbs 3x10 seated, 2x 3x10 standing off 2" step,          2x12 each 15 lbs seated, standing 0 lbs, 10x standing off heel slide board                 Step ups          3x10 8"    Recumbent bike  6 min lvl 6  6 min 6 min        6 min lvl 6    Leg press 2x12 155 lbs 2 up 1 down 3x10 155 lbs 2 up 1 down  3x10 B LEs 225 lbs, 4x12 225 lbs    Retro walking on treadmill 1 2 mph 3 min          1 1 mph 3 min   Step downs  3x10 8"  3x10 8"                                     steps 4 laps flight of steps                         Modalities

## 2020-09-04 NOTE — PATIENT INSTRUCTIONS
ALEXANDER Winchester    Attending, Orthopaedic Surgery  [facility]    Achilles Rupture - Nonoperative Treatment Protocol    OVERVIEW:   Week 0: cast, touchdown weightbearing with crutches / walker  o Avoid prolonged dependent position (keep foot elevated)   Week 2: CAM walker boot with 3 heel wedges, weightbearing as tolerated  o Wear boot AT ALL TIMES   Week 6: CAM walker boot, remove one wedge each week (so down to 2 wedges at week 6, 1 wedge at week 7, 0 wedges at week 8)  o Start gentle active ankle range of motion (trace alphabet with toes, circles in both directions)   Week 8: start PT, transition to shoe with heel lift, wean off crutches  o PT 2-3 times/week and home exercises daily   Progress with PT over ~4 months  o Week 12: wean off of shoe lift  o Week 20 / 5 months: gentle jog   Week 24 / 6 months:  o Gradual return to sports as tolerated    DETAILED PHYSICAL THERAPY PROTOCOL:  Phase I: weeks 8 - 12   Goals:  o Normalize gait  o Progress range of motion  o Normalize dorsiflexion, inversion, and eversion ankle strength 5/5   Treatment Recommendations:  o Gait training, wean off crutches/cane when gait non-antalgic  o Heel lift in shoe to assist non-apprehensive and normalized gait  o AROM dorsiflexion/plantarflexion/inversion/eversion  o Proprioception training  o Isometrics/isotonics: inversion/eversion  o Sitting heel rise  o PREs plantarflexion/dorsiflexion with knee flexed to 90; after 2 weeks, progress to PREs with knee extended to 0  o Leg press  o Bike  o Alphabet drawing  o Retro treadmill  o Forward step up program  o Underwater treadmill system for gait training   Precautions:  o Avoid passive heel cord stretching   Minimum Criteria for Advancement:  o Normal gait pattern  o Manual muscle grade test of 5/5 for dorsiflexion, inversion, and eversion    Phase II: weeks 12 - 20   Goals:  o Restore full functional range of motion  o Normalize plantarflexion strength 5/5  o Normalize balance  o Return to functional activities without pain  o Ability to descend stairs   Treatment Recommendations:  o Submaximal sport-specific skill development  o Proprioception training: BAPS, prop board, foam rollers, trampoline, Neurocom  o Isometrics/isotonics: inversion/eversion  o Isokinetic plantarflexion/dorsiflexion  o Standing heel rise  o Aggressive PREs plantarflexion  o Progress proximal strengthening (PREs)  o Bike, Stairmaster, Versaclimber  o Forward step down program  o Running in underwater treadmill system   Precautions:  o Avoid pain with therapeutic exercise and functional activities  o Avoid high loading the Achilles tendon (i e  aggressive stretching in dorsiflexion with body weight or jumping)   Minimum Criteria for Advancement:  o No apprehension with activities of daily living  o Normal flexibility  o Adequate strength base shown by ability to perform ten unilateral heel raises  o Ability to descend stairs reciprocally  o Symmetrical lower extremity balance    Phase III: weeks 20 - 28   Goals:  o Demonstrate ability to run forward on a treadmill symptom-free  o Average peak torque of 75% with isokinetic testing  o Maximize strength and flexibility as to meet all demands of ADLs  o Return to functional activity without limitation  o Higher level of dynamic activity with lack of apprehension with sport-specific movements   Treatment Recommendations:  o Start forward treadmill running  o Isokinetic testing and training  o Continue lower extremity strengthening and flexibility program  o Advance proprioception training with perturbation  o Light plyometric training (bilateral jumping activities)  o Continue aggressive plantarflexion PREs (emphasize eccentric activity)  o Submaximal sport specific skill development drills  o Progress bike, Stairmaster, Versaclimber  o Continue to progress proximal strengthening of lower extremities (PREs)   Precautions:  o No apprehension or pain with dynamic activity  o Avoid running or sport activity until adequate strength and flexibility is achieved   Minimum Criteria for Advancement:  o Pain free running  o Average peak torque of isokinetic test = 75% of non-involved  o Normal strength (5/5 throughout ankle)  o Sports-specific drills with zero apprehension    Phase IV: Return to Sport (weeks 28 - one year)   Goals:  o Lack of apprehension with sports activity  o Maximize strength and flexibility as to meet demands of individual's sport activity   Treatment Recommendations:  o Advanced functional exercises and agility exercises  o Plyometrics  o Sport-specific exercises  o Isokinetic testing  o Functional test assessment (such as vertical jump test)   Precautions:  o Avoid pain with therapeutic, functional, and sport activity  o Avoid full sport activity until adequate strength and flexibility   Criteria for Discharge:  o Flexibility and strength to accepted levels for sports performance  o Lack of apprehension with sport-specific movements  o 85% limb symmetry with vertical jump test  o 85% limb symmetry for average peak isokinetic torque (PF/DF/inv/ev)  o Independent performance of gym/home exercise program

## 2020-09-08 ENCOUNTER — OFFICE VISIT (OUTPATIENT)
Dept: PHYSICAL THERAPY | Facility: CLINIC | Age: 40
End: 2020-09-08
Payer: COMMERCIAL

## 2020-09-08 DIAGNOSIS — S86.012D RUPTURE OF LEFT ACHILLES TENDON, SUBSEQUENT ENCOUNTER: Primary | ICD-10-CM

## 2020-09-08 PROCEDURE — 97110 THERAPEUTIC EXERCISES: CPT | Performed by: PHYSICAL THERAPIST

## 2020-09-08 PROCEDURE — 97112 NEUROMUSCULAR REEDUCATION: CPT | Performed by: PHYSICAL THERAPIST

## 2020-09-08 NOTE — PROGRESS NOTES
Daily Note     Today's date: 2020  Patient name: Casper Mazariegos  : 1980  MRN: [id number]   Referring provider: Saskia Ruiz MD  Dx:   Encounter Diagnosis     ICD-10-CM    1  Rupture of left Achilles tendon, subsequent encounter  S86 012D                   Subjective: Patient notes starting to ambulate with more normal gait pattern and feeling stronger  Got higher compression socks to help with still some minor swelling  Saw MD and notes everything looks good  Objective: See treatment diary below      Assessment: Tolerated treatment well  Patient would benefit from continued PT  Patient is 15 5 weeks out  Fatigued post treatment  Introduction to standing eccentric heel raises  Progressing well  Plan: Progress treatment as tolerated         Precautions: rupture 3/12/2020    Manuals    [address]kle PROM  [address]      [address]   Calf stretch  [address] [facility]      [address]   Posterior talocrural mobs L    [address]                      Neuro Re-Ed             SLS 3x20 ball toss red ball  3x20 ball toss red ball grn foam  3x20 ball toss red ball blue foam  3x20 red ball toss blue foam       5x 15" holds grn foam                  Wobble board L4 w ways 20x each L4 4 ways 20x each L4 4 ways 20x each L5 4 ways 20x each       L4 4 ways 20x each   Tandem walking          2 laps ->   Star drill 15x 3 ways grn foam  15x 3 ways grn foam -> 15x 3 ways blue foam  15x 4 ways blue foam       15x 3 ways ->   Single leg RDLs   2x8  2x8                      Ther Ex             Ankle inv/eversion isometrics to theraband 2x15 grn  2x10 blue 2x12 blue  2x12 red       2x15 grn                 Heel raise variations 2x12 each 15 lbs, standing 0 lbs off heel slide board 3x10 17 5 lbs seated,3x10 B heel raises off 2" step, 2x15 35 lbs leg press U calf raise 22 5 lbs 3x10 seated, 2x 3x10 standing off 2" step,    25 lbs 3x10 seated, 15x off 2" step conc, 15x off 2" step ecc, 3x10 45 lbs L calf raises      2x12 each 15 lbs seated, standing 0 lbs, 10x standing off heel slide board                 Step ups          3x10 8"    Recumbent bike  6 min lvl 6  6 min 6 min  6 min       6 min lvl 6    Leg press 2x12 155 lbs 2 up 1 down 3x10 155 lbs 2 up 1 down  3x10 B LEs 225 lbs,        4x12 225 lbs    Retro walking on treadmill 1 2 mph 3 min          1 1 mph 3 min   Step downs  3x10 8"  3x10 8"  3x10 8"                                    steps 4 laps flight of steps                         Modalities

## 2020-09-10 ENCOUNTER — OFFICE VISIT (OUTPATIENT)
Dept: PHYSICAL THERAPY | Facility: CLINIC | Age: 40
End: 2020-09-10
Payer: COMMERCIAL

## 2020-09-10 DIAGNOSIS — S86.012D RUPTURE OF LEFT ACHILLES TENDON, SUBSEQUENT ENCOUNTER: Primary | ICD-10-CM

## 2020-09-10 PROCEDURE — 97112 NEUROMUSCULAR REEDUCATION: CPT

## 2020-09-10 PROCEDURE — 97110 THERAPEUTIC EXERCISES: CPT

## 2020-09-10 NOTE — PROGRESS NOTES
Daily Note     Today's date: 9/10/2020  Patient name: Renee Orellana  : 1980  MRN: [id number]  Referring provider: Ann Rodríguez MD  Dx:   Encounter Diagnosis     ICD-10-CM    1  Rupture of left Achilles tendon, subsequent encounter  S86 012D           Subjective: Pt reports that he notes improved strength and mobility in L calf and ankle      Objective: See treatment diary below     Precautions: rupture 3/12/2020    Manuals 8/27 8/31 9/4 9/8 9/10        Ankle PROM [address] [facility]        Calf stretch [facility]        Posterior talocrural mobs L   [facility]                     Neuro Re-Ed     9/10        SLS 3x20 ball toss red ball  3x20 ball toss red ball grn foam  3x20 ball toss red ball blue foam  3x20 red ball toss blue foam  3x20 sm red ball toss blue foam                       Wobble board L4 w ways 20x each L4 4 ways 20x each L4 4 ways 20x each L5 4 ways 20x each  L5  4 ways 20x each         Tandem walking             Star drill 15x 3 ways grn foam  15x 3 ways grn foam -> 15x 3 ways blue foam  15x 4 ways blue foam  15x  3 ways blue foam        Single leg RDLs   2x8  2x8 2x10        Biodex postural stability     Single leg, center target, 1'x2        Ther Ex     9/10        Ankle inv/eversion isometrics to theraband 2x15 grn  2x10 blue 2x12 blue  2x12 red                       Heel raise variations 2x12 each 15 lbs, standing 0 lbs off heel slide board 3x10 17 5 lbs seated,3x10 B heel raises off 2" step, 2x15 35 lbs leg press U calf raise 22 5 lbs 3x10 seated, 2x 3x10 standing off 2" step,    25 lbs 3x10 seated, 15x off 2" step conc, 15x off 2" step ecc, 3x10 45 lbs L calf raises 25# DB 3x10 seated, 15x ea 2" step conc & ecc, 3x10  45# L calf raises 1/2 foam                     Step ups             Recumbent bike  6 min lvl 6  6 min 6 min  6 min  6'        Leg press 2x12 155 lbs 2 up 1 down 3x10 155 lbs 2 up 1 down  3x10 B LEs 225 lbs,   -        Retro walking on treadmill 1 2 mph 3 min     - Step downs  3x10 8"  3x10 8"  3x10 8"  8" 3x10        Single leg STS chair     Foam on chair 2x10                     steps 4 laps flight of steps                         Modalities                                           Assessment: Tolerated treatment well  Patient demonstrated fatigue post treatment, exhibited good technique with therapeutic exercises and would benefit from continued PT to return to prior level of activity when able  Plan: Continue per plan of care  Progress treatment as tolerated      Milana Luevano, PTA

## 2020-09-14 ENCOUNTER — APPOINTMENT (OUTPATIENT)
Dept: PHYSICAL THERAPY | Facility: CLINIC | Age: 40
End: 2020-09-14
Payer: COMMERCIAL

## 2020-09-15 ENCOUNTER — OFFICE VISIT (OUTPATIENT)
Dept: PHYSICAL THERAPY | Facility: CLINIC | Age: 40
End: 2020-09-15
Payer: COMMERCIAL

## 2020-09-15 DIAGNOSIS — S86.012D RUPTURE OF LEFT ACHILLES TENDON, SUBSEQUENT ENCOUNTER: Primary | ICD-10-CM

## 2020-09-15 PROCEDURE — 97112 NEUROMUSCULAR REEDUCATION: CPT | Performed by: PHYSICAL THERAPIST

## 2020-09-15 PROCEDURE — 97110 THERAPEUTIC EXERCISES: CPT | Performed by: PHYSICAL THERAPIST

## 2020-09-15 NOTE — PROGRESS NOTES
Daily Note     Today's date: 9/15/2020  Patient name: Niranjan Perez  : 1980  MRN: [id number]  Referring provider: Brittaney King MD  Dx:   Encounter Diagnosis     ICD-10-CM    1  Rupture of left Achilles tendon, subsequent encounter  S86 012D                   Subjective: Patient notes starting to walk fast to get better endurance  Objective: See treatment diary below      Assessment: Tolerated treatment well  Patient would benefit from continued PT  16 5 weeks out since initial injury  Patient progressing well as able to tolerate more standing eccentric heel raises on L LE  Plan: Progress treatment as tolerated         Precautions: rupture 3/12/2020    Manuals 8/27 8/31 9/4 9/8 9/10 9/15       Ankle PROM [address]       Calf [address]       Posterior talocrural mobs L   [address]                    Neuro Re-Ed     9/10        SLS 3x20 ball toss red ball  3x20 ball toss red ball grn foam  3x20 ball toss red ball blue foam  3x20 red ball toss blue foam  3x20 sm red ball toss blue foam  3x20 ylw ball ball toss blue sq foam                      Wobble board L4 w ways 20x each L4 4 ways 20x each L4 4 ways 20x each L5 4 ways 20x each  L5  4 ways 20x each  L5 4 ways 20x each        Tandem walking             Star drill 15x 3 ways grn foam  15x 3 ways grn foam -> 15x 3 ways blue foam  15x 4 ways blue foam  15x  3 ways blue foam 15x 4 ways cones blue sq foam        Single leg RDLs   2x8  2x8 2x10 2x10 5 lbs in each hand        Biodex postural stability     Single leg, center target, 1'x2        Ther Ex     9/10        Ankle inv/eversion isometrics to theraband 2x15 grn  2x10 blue 2x12 blue  2x12 red                       Heel raise variations 2x12 each 15 lbs, standing 0 lbs off heel slide board 3x10 17 5 lbs seated,3x10 B heel raises off 2" step, 2x15 35 lbs leg press U calf raise 22 5 lbs 3x10 seated, 2x 3x10 standing off 2" step,    25 lbs 3x10 seated, 15x off 2" step conc, 15x off 2" step ecc, 3x10 45 lbs L calf raises 25# DB 3x10 seated, 15x ea 2" step conc & ecc, 3x10  45# L calf raises 1/2 foam 3x10 30 lbs seated, 10x standing B, 20x ecc, 3x10 45 lbs L calf 1/2 foam                     Step ups             Recumbent bike  6 min lvl 6  6 min 6 min  6 min  6' 6 min        Leg press 2x12 155 lbs 2 up 1 down 3x10 155 lbs 2 up 1 down  3x10 B LEs 225 lbs,   -        Retro walking on treadmill 1 2 mph 3 min     -        Step downs  3x10 8"  3x10 8"  3x10 8"  8" 3x10 3x10 8"        Single leg STS chair     Foam on chair 2x10 2x10                     steps 4 laps flight of steps                         Modalities

## 2020-09-17 ENCOUNTER — OFFICE VISIT (OUTPATIENT)
Dept: PHYSICAL THERAPY | Facility: CLINIC | Age: 40
End: 2020-09-17
Payer: COMMERCIAL

## 2020-09-17 DIAGNOSIS — S86.012D RUPTURE OF LEFT ACHILLES TENDON, SUBSEQUENT ENCOUNTER: Primary | ICD-10-CM

## 2020-09-17 PROCEDURE — 97110 THERAPEUTIC EXERCISES: CPT

## 2020-09-17 PROCEDURE — 97140 MANUAL THERAPY 1/> REGIONS: CPT

## 2020-09-17 PROCEDURE — 97112 NEUROMUSCULAR REEDUCATION: CPT

## 2020-09-17 NOTE — PROGRESS NOTES
Daily Note     Today's date: 2020  Patient name: Aline Leal  : 1980  MRN: [id number]  Referring provider: Monica Treviño MD  Dx:   Encounter Diagnosis     ICD-10-CM    1  Rupture of left Achilles tendon, subsequent encounter  S86 012D                   Subjective: Pt reports minimal soreness with step downs and to palpation of Achilles and notes most difficulty with descending stairs and overall stability      Objective: See treatment diary below     Precautions: rupture 3/12/2020    Manuals 8/27 8/31 9/4 9/8 9/10 9/15 9/17      Ankle PROM [address] [facility] [address]      Calf stretch  [address]      Posterior talocrural mobs L    [address]                   Neuro Re-Ed     9/10  9/17      SLS 3x20 ball toss red ball  3x20 ball toss red ball grn foam  3x20 ball toss red ball blue foam  3x20 red ball toss blue foam  3x20 sm red ball toss blue foam  3x20 ylw ball ball toss blue sq foam  3x20 ylw ball blue sq foam       inv BOSU ball toss w/PT       Balance only 30"x2      Wobble board L4 w ways 20x each L4 4 ways 20x each L4 4 ways 20x each L5 4 ways 20x each  L5  4 ways 20x each  L5 4 ways 20x each  L5 4 ways 20x each       Tandem walking             Star drill 15x 3 ways grn foam  15x 3 ways grn foam -> 15x 3 ways blue foam  15x 4 ways blue foam  15x  3 ways blue foam 15x 4 ways cones blue sq foam  15x  3 ways blue foam      Single leg RDLs   2x8  2x8 2x10 2x10 5 lbs in each hand  10#s 2x10      Biodex postural stability     Single leg, center target, 1'x2        Ther Ex     9/10  9/17      Ankle inv/eversion isometrics to theraband 2x15 grn  2x10 blue 2x12 blue  2x12 red                       Heel raise variations 2x12 each 15 lbs, standing 0 lbs off heel slide board 3x10 17 5 lbs seated,3x10 B heel raises off 2" step, 2x15 35 lbs leg press U calf raise 22 5 lbs 3x10 seated, 2x 3x10 standing off 2" step,    25 lbs 3x10 seated, 15x off 2" step conc, 15x off 2" step ecc, 3x10 45 lbs L calf raises 25# DB 3x10 seated, 15x ea 2" step conc & ecc, 3x10  45# L calf raises 1/2 foam 3x10 30 lbs seated, 10x standing B, 20x ecc, 3x10 45 lbs L calf 1/2 foam 3x10 30# DB seated, 10x standing B, 20x ecc, 4x10 45#  L calf on 1/2 foam                   Step ups             Recumbent bike  6 min lvl 6  6 min 6 min  6 min  6' 6 min  6' high      Leg press 2x12 155 lbs 2 up 1 down 3x10 155 lbs 2 up 1 down  3x10 B LEs 225 lbs,   -        Retro walking on treadmill 1 2 mph 3 min     -        Step downs  3x10 8"  3x10 8"  3x10 8"  8" 3x10 3x10 8"  8" 3x10      Single leg STS chair     Foam on chair 2x10 2x10  Foam on chair 4x5      sliders       3 ways, 15x ea      steps 4 laps flight of steps                         Modalities                                           Assessment: Tolerated treatment well  Patient demonstrated fatigue post treatment, exhibited good technique with therapeutic exercises and would benefit from continued PT to improve stability and return to running when able  Plan: Continue per plan of care  Progress treatment as tolerated      Hiwot Montemayor, PTA

## 2020-09-21 ENCOUNTER — OFFICE VISIT (OUTPATIENT)
Dept: PHYSICAL THERAPY | Facility: CLINIC | Age: 40
End: 2020-09-21
Payer: COMMERCIAL

## 2020-09-21 DIAGNOSIS — S86.012D RUPTURE OF LEFT ACHILLES TENDON, SUBSEQUENT ENCOUNTER: Primary | ICD-10-CM

## 2020-09-21 PROCEDURE — 97112 NEUROMUSCULAR REEDUCATION: CPT | Performed by: PHYSICAL THERAPIST

## 2020-09-21 PROCEDURE — 97110 THERAPEUTIC EXERCISES: CPT | Performed by: PHYSICAL THERAPIST

## 2020-09-21 NOTE — PROGRESS NOTES
Daily Note     Today's date: 2020  Patient name: Faiza June  : 1980  MRN: [id number]  Referring provider: Nay Dailey MD  Dx:   Encounter Diagnosis     ICD-10-CM    1  Rupture of left Achilles tendon, subsequent encounter  S86 012D                   Subjective: Patient notes did a lot Thursday even after PT and felt good on Friday  Had some soreness over weekend  Objective: See treatment diary below      Assessment: Tolerated treatment well  Patient would benefit from continued PT  Patient is 17 5 weeks out from initial injury  Progressing well  Plan: Progress treatment as tolerated    Consider IASTM to L calf if continued feeling of tightness     Precautions: rupture 3/12/2020    Manuals /4 9/8 9/10 9/15 9/17 9/21     [address]le [address]OM [address]     [address]alf [address]     Posterior talocrural mobs L   [address]                  Neuro Re-Ed     9/10  9/17 9/21     SLS 3x20 ball toss red ball  3x20 ball toss red ball grn foam  3x20 ball toss red ball blue foam  3x20 red ball toss blue foam  3x20 sm red ball toss blue foam  3x20 ylw ball ball toss blue sq foam  3x20 ylw ball blue sq foam  3x20 ylw ball blue sq foam      inv BOSU ball toss w/PT       Balance only 30"x2 3x30" holds balance only     Wobble board L4 w ways 20x each L4 4 ways 20x each L4 4 ways 20x each L5 4 ways 20x each  L5  4 ways 20x each  L5 4 ways 20x each  L5 4 ways 20x each  L 5 4 ways 20x each      Tandem walking             Star drill 15x 3 ways grn foam  15x 3 ways grn foam -> 15x 3 ways blue foam  15x 4 ways blue foam  15x  3 ways blue foam 15x 4 ways cones blue sq foam  15x  3 ways blue foam      Single leg RDLs   2x8  2x8 2x10 2x10 5 lbs in each hand  10#s 2x10 2x12 10 lbs      Biodex postural stability     Single leg, center target, 1'x2        Ther Ex     9/10  9/17 9/21     Ankle inv/eversion isometrics to theraband 2x15 grn  2x10 blue 2x12 blue  2x12 red Heel raise variations 2x12 each 15 lbs, standing 0 lbs off heel slide board 3x10 17 5 lbs seated,3x10 B heel raises off 2" step, 2x15 35 lbs leg press U calf raise 22 5 lbs 3x10 seated, 2x 3x10 standing off 2" step,    25 lbs 3x10 seated, 15x off 2" step conc, 15x off 2" step ecc, 3x10 45 lbs L calf raises 25# DB 3x10 seated, 15x ea 2" step conc & ecc, 3x10  45# L calf raises 1/2 foam 3x10 30 lbs seated, 10x standing B, 20x ecc, 3x10 45 lbs L calf 1/2 foam 3x10 30# DB seated, 10x standing B, 20x ecc, 4x10 45#  L calf on 1/2 foam 3x10 35 lbs seated, 10x standing B, 25x ecc, 3x10 55 lbs L LE                   Step ups             Recumbent bike  6 min lvl 6  6 min 6 min  6 min  6' 6 min  6' high 6 min      Leg press 2x12 155 lbs 2 up 1 down 3x10 155 lbs 2 up 1 down  3x10 B LEs 225 lbs,   -        Retro walking on treadmill 1 2 mph 3 min     -        Step downs  3x10 8"  3x10 8"  3x10 8"  8" 3x10 3x10 8"  8" 3x10 3x10 8"      Single leg STS chair     Foam on chair 2x10 2x10  Foam on chair 4x5      sliders       3 ways, 15x ea 3 ways 15x each      steps 4 laps flight of steps                         Modalities

## 2020-09-25 ENCOUNTER — OFFICE VISIT (OUTPATIENT)
Dept: PHYSICAL THERAPY | Facility: CLINIC | Age: 40
End: 2020-09-25
Payer: COMMERCIAL

## 2020-09-25 DIAGNOSIS — S86.012D RUPTURE OF LEFT ACHILLES TENDON, SUBSEQUENT ENCOUNTER: Primary | ICD-10-CM

## 2020-09-25 PROCEDURE — 97112 NEUROMUSCULAR REEDUCATION: CPT | Performed by: PHYSICAL THERAPIST

## 2020-09-25 PROCEDURE — 97110 THERAPEUTIC EXERCISES: CPT | Performed by: PHYSICAL THERAPIST

## 2020-09-25 NOTE — PROGRESS NOTES
Daily Note     Today's date: 2020  Patient name: Tessy Bustos  : 1980  MRN: [id number]  Referring provider: Daved Cockayne, MD  Dx:   Encounter Diagnosis     ICD-10-CM    1  Rupture of left Achilles tendon, subsequent encounter  S86 012D        Start Time: 830          Subjective: Patient notes starting to wear his typical running shoes to help get his foot used to wearing them since he is getting close to starting to be able to jog  Objective: See treatment diary below    Educated to complete standing calf stretch for HEP as well    Assessment: Tolerated treatment well  Patient would benefit from continued PT  Patient is 18 weeks out from initial injury  Progressing well  Plan: Progress treatment as tolerated    Consider IASTM to L calf if continued feeling of tightness     Precautions: rupture 3/12/2020    Manuals 8/27 8/31 9/4 9/8 9/10 9/15 9/17 9/21 9/25    [address]le [address]OM [address]alf [address]    Posterior talocrural mobs L   [address]                 Neuro Re-Ed     9/10  9/17 9/21 9/25    SLS 3x20 ball toss red ball  3x20 ball toss red ball grn foam  3x20 ball toss red ball blue foam  3x20 red ball toss blue foam  3x20 sm red ball toss blue foam  3x20 ylw ball ball toss blue sq foam  3x20 ylw ball blue sq foam  3x20 ylw ball blue sq foam  3x20 ylw ball black foam     inv BOSU ball toss w/PT       Balance only 30"x2 3x30" holds balance only 3x30" holds     Wobble board L4 w ways 20x each L4 4 ways 20x each L4 4 ways 20x each L5 4 ways 20x each  L5  4 ways 20x each  L5 4 ways 20x each  L5 4 ways 20x each  L 5 4 ways 20x each  L5 4 ways 20x each     Tandem walking         2 laps blue foam     Star drill 15x 3 ways grn foam  15x 3 ways grn foam -> 15x 3 ways blue foam  15x 4 ways blue foam  15x  3 ways blue foam 15x 4 ways cones blue sq foam  15x  3 ways blue foam      Single leg RDLs   2x8  2x8 2x10 2x10 5 lbs in each hand  10#s 2x10 2x12 10 lbs  3x10 10 lbs     Biodex postural stability     Single leg, center target, 1'x2        Ther Ex     9/10  9/17 9/21 9/25    Ankle inv/eversion isometrics to theraband 2x15 grn  2x10 blue 2x12 blue  2x12 red                       Heel raise variations 2x12 each 15 lbs, standing 0 lbs off heel slide board 3x10 17 5 lbs seated,3x10 B heel raises off 2" step, 2x15 35 lbs leg press U calf raise 22 5 lbs 3x10 seated, 2x 3x10 standing off 2" step,    25 lbs 3x10 seated, 15x off 2" step conc, 15x off 2" step ecc, 3x10 45 lbs L calf raises 25# DB 3x10 seated, 15x ea 2" step conc & ecc, 3x10  45# L calf raises 1/2 foam 3x10 30 lbs seated, 10x standing B, 20x ecc, 3x10 45 lbs L calf 1/2 foam 3x10 30# DB seated, 10x standing B, 20x ecc, 4x10 45#  L calf on 1/2 foam 3x10 35 lbs seated, 10x standing B, 25x ecc, 3x10 55 lbs L LE  3x10 37 5 lbs seated, 30x ecc standing, 3x10 65 lbs L LE                  Step ups             Recumbent bike  6 min lvl 6  6 min 6 min  6 min  6' 6 min  6' high 6 min  6 min     Leg press 2x12 155 lbs 2 up 1 down 3x10 155 lbs 2 up 1 down  3x10 B LEs 225 lbs,   -        Retro walking on treadmill 1 2 mph 3 min     -        Step downs  3x10 8"  3x10 8"  3x10 8"  8" 3x10 3x10 8"  8" 3x10 3x10 8"  3x10 8"     Single leg STS chair     Foam on chair 2x10 2x10  Foam on chair 4x5  Lateral step downs 3x10     sliders       3 ways, 15x ea 3 ways 15x each  15x 3 ways     steps 4 laps flight of steps                         Modalities

## 2020-09-29 ENCOUNTER — OFFICE VISIT (OUTPATIENT)
Dept: PHYSICAL THERAPY | Facility: CLINIC | Age: 40
End: 2020-09-29
Payer: COMMERCIAL

## 2020-09-29 DIAGNOSIS — S86.012D RUPTURE OF LEFT ACHILLES TENDON, SUBSEQUENT ENCOUNTER: Primary | ICD-10-CM

## 2020-09-29 PROCEDURE — 97140 MANUAL THERAPY 1/> REGIONS: CPT

## 2020-09-29 PROCEDURE — 97110 THERAPEUTIC EXERCISES: CPT

## 2020-09-29 PROCEDURE — 97112 NEUROMUSCULAR REEDUCATION: CPT

## 2020-09-29 NOTE — PROGRESS NOTES
Daily Note     Today's date: 2020  Patient name: David Plasencia  : 1980  MRN: [id number]  Referring provider: Jj Queen MD  Dx:   Encounter Diagnosis     ICD-10-CM    1  Rupture of left Achilles tendon, subsequent encounter  [id number]                   Subjective: Pt reports that he "feels fine and had no issues following LV"      Objective: See treatment diary below     Precautions: rupture 3/12/2020    Manuals  9/4 9/8 9/10 9/15 9/17 9/21 9/25 9/29   Ankle [address]OM [address]    Calf stretch [address]   Posterior talocrural mobs L   [address]                Neuro Re-Ed     9/10  9/17 9/21 9/25 9/29   SLS 3x20 ball toss red ball  3x20 ball toss red ball grn foam  3x20 ball toss red ball blue foam  3x20 red ball toss blue foam  3x20 sm red ball toss blue foam  3x20 ylw ball ball toss blue sq foam  3x20 ylw ball blue sq foam  3x20 ylw ball blue sq foam  3x20 ylw ball black foam  3x20  Ylw ball  blk foam   inv BOSU ball toss w/PT       Balance only 30"x2 3x30" holds balance only 3x30" holds  30"x3 squats   Wobble board L4 w ways 20x each L4 4 ways 20x each L4 4 ways 20x each L5 4 ways 20x each  L5  4 ways 20x each  L5 4 ways 20x each  L5 4 ways 20x each  L 5 4 ways 20x each  L5 4 ways 20x each  -   Tandem walking         2 laps blue foam  3 laps blue foam fw/bw   Star drill 15x 3 ways grn foam  15x 3 ways grn foam -> 15x 3 ways blue foam  15x 4 ways blue foam  15x  3 ways blue foam 15x 4 ways cones blue sq foam  15x  3 ways blue foam   -   Single leg RDLs   2x8  2x8 2x10 2x10 5 lbs in each hand  10#s 2x10 2x12 10 lbs  3x10 10 lbs  10# 3x10   Biodex postural stability     Single leg, center target, 1'x2     -   BAPS standing          L4 20x ea AP/ML   Ther Ex     9/10  9/17 9/21 9/25 9/29   Ankle inv/eversion isometrics to theraband 2x15 grn  2x10 blue 2x12 blue  2x12 red       -                Heel raise variations 2x12 each 15 lbs, standing 0 lbs off heel slide board 3x10 17 5 lbs seated,3x10 B heel raises off 2" step, 2x15 35 lbs leg press U calf raise 22 5 lbs 3x10 seated, 2x 3x10 standing off 2" step,    25 lbs 3x10 seated, 15x off 2" step conc, 15x off 2" step ecc, 3x10 45 lbs L calf raises 25# DB 3x10 seated, 15x ea 2" step conc & ecc, 3x10  45# L calf raises 1/2 foam 3x10 30 lbs seated, 10x standing B, 20x ecc, 3x10 45 lbs L calf 1/2 foam 3x10 30# DB seated, 10x standing B, 20x ecc, 4x10 45#  L calf on 1/2 foam 3x10 35 lbs seated, 10x standing B, 25x ecc, 3x10 55 lbs L LE  3x10 37 5 lbs seated, 30x ecc standing, 3x10 65 lbs L LE  3x10 ea: 37 5# seated, ecc stand,  75# leg press LLE   Single leg HR          TM 3x10   Step ups             Recumbent bike  6 min lvl 6  6 min 6 min  6 min  6' 6 min  6' high 6 min  6 min  Upright L6 6'   Leg press 2x12 155 lbs 2 up 1 down 3x10 155 lbs 2 up 1 down  3x10 B LEs 225 lbs,   -     -   Retro walking on treadmill 1 2 mph 3 min     -     -   Step downs  3x10 8"  3x10 8"  3x10 8"  8" 3x10 3x10 8"  8" 3x10 3x10 8"  3x10 8"     Single leg STS chair     Foam on chair 2x10 2x10  Foam on chair 4x5   4x5   sliders       3 ways, 15x ea 3 ways 15x each  15x 3 ways  3 ways  20x ea   steps 4 laps flight of steps            Lateral stepdowns         3x10 8 inch 3x10   Kneeling single leg hops          Hi-lo 2x10                    Assessment: Tolerated treatment well  Patient demonstrated fatigue post treatment, exhibited good technique with therapeutic exercises and would benefit from continued PT to return to jogging when cleared by physician  Plan: Continue per plan of care  Progress treament per protocol       Nella Kenny, LORRIE

## 2020-10-01 ENCOUNTER — APPOINTMENT (OUTPATIENT)
Dept: PHYSICAL THERAPY | Facility: CLINIC | Age: 40
End: 2020-10-01
Payer: COMMERCIAL

## 2020-10-02 ENCOUNTER — OFFICE VISIT (OUTPATIENT)
Dept: PHYSICAL THERAPY | Facility: CLINIC | Age: 40
End: 2020-10-02
Payer: COMMERCIAL

## 2020-10-02 DIAGNOSIS — S86.012D RUPTURE OF LEFT ACHILLES TENDON, SUBSEQUENT ENCOUNTER: Primary | ICD-10-CM

## 2020-10-02 PROCEDURE — 97112 NEUROMUSCULAR REEDUCATION: CPT | Performed by: PHYSICAL THERAPIST

## 2020-10-02 PROCEDURE — 97110 THERAPEUTIC EXERCISES: CPT | Performed by: PHYSICAL THERAPIST

## 2020-10-05 ENCOUNTER — OFFICE VISIT (OUTPATIENT)
Dept: PHYSICAL THERAPY | Facility: CLINIC | Age: 40
End: 2020-10-05
Payer: COMMERCIAL

## 2020-10-05 DIAGNOSIS — S86.012D RUPTURE OF LEFT ACHILLES TENDON, SUBSEQUENT ENCOUNTER: Primary | ICD-10-CM

## 2020-10-05 PROCEDURE — 97110 THERAPEUTIC EXERCISES: CPT | Performed by: PHYSICAL THERAPIST

## 2020-10-05 PROCEDURE — 97112 NEUROMUSCULAR REEDUCATION: CPT | Performed by: PHYSICAL THERAPIST

## 2020-10-08 ENCOUNTER — OFFICE VISIT (OUTPATIENT)
Dept: PHYSICAL THERAPY | Facility: CLINIC | Age: 40
End: 2020-10-08
Payer: COMMERCIAL

## 2020-10-08 DIAGNOSIS — S86.012D RUPTURE OF LEFT ACHILLES TENDON, SUBSEQUENT ENCOUNTER: Primary | ICD-10-CM

## 2020-10-08 PROCEDURE — 97112 NEUROMUSCULAR REEDUCATION: CPT

## 2020-10-08 PROCEDURE — 97110 THERAPEUTIC EXERCISES: CPT

## 2020-10-14 ENCOUNTER — OFFICE VISIT (OUTPATIENT)
Dept: PHYSICAL THERAPY | Facility: CLINIC | Age: 40
End: 2020-10-14
Payer: COMMERCIAL

## 2020-10-14 DIAGNOSIS — S86.012D RUPTURE OF LEFT ACHILLES TENDON, SUBSEQUENT ENCOUNTER: Primary | ICD-10-CM

## 2020-10-14 PROCEDURE — 97112 NEUROMUSCULAR REEDUCATION: CPT | Performed by: PHYSICAL THERAPIST

## 2020-10-14 PROCEDURE — 97110 THERAPEUTIC EXERCISES: CPT | Performed by: PHYSICAL THERAPIST

## 2020-10-16 ENCOUNTER — OFFICE VISIT (OUTPATIENT)
Dept: PHYSICAL THERAPY | Facility: CLINIC | Age: 40
End: 2020-10-16
Payer: COMMERCIAL

## 2020-10-16 DIAGNOSIS — S86.012D RUPTURE OF LEFT ACHILLES TENDON, SUBSEQUENT ENCOUNTER: Primary | ICD-10-CM

## 2020-10-16 PROCEDURE — 97110 THERAPEUTIC EXERCISES: CPT | Performed by: PHYSICAL THERAPIST

## 2020-10-16 PROCEDURE — 97112 NEUROMUSCULAR REEDUCATION: CPT | Performed by: PHYSICAL THERAPIST
